# Patient Record
(demographics unavailable — no encounter records)

---

## 2025-01-24 NOTE — NUR
01/24/25 0955 Ivana Wolfe
History, Chart, Medications and Allergies reviewed before start of
procedure.O2 VIA POM INTACT THROUGHOUT SEDATION/PROCEDURE. MONITOR
INTACT WITH CONTINUOUS PULSE OXIMETRY, CONTINUOUS END TITAL CO2, AND
INTERMITTENT BLOOD PRESSURE.

## 2025-01-24 NOTE — NUR
REPORTS TAKING ALL OF COLON PREP WITH CLEAR LIGHT YELLOW RESULTS.
Patient States Post-Procedure ride home has been arranged with , who is
at bedside.